# Patient Record
Sex: MALE | Race: OTHER | HISPANIC OR LATINO | URBAN - METROPOLITAN AREA
[De-identification: names, ages, dates, MRNs, and addresses within clinical notes are randomized per-mention and may not be internally consistent; named-entity substitution may affect disease eponyms.]

---

## 2024-01-17 ENCOUNTER — EMERGENCY (EMERGENCY)
Facility: HOSPITAL | Age: 44
LOS: 0 days | Discharge: HOME | End: 2024-01-17
Attending: EMERGENCY MEDICINE
Payer: MEDICAID

## 2024-01-17 VITALS
DIASTOLIC BLOOD PRESSURE: 82 MMHG | RESPIRATION RATE: 18 BRPM | TEMPERATURE: 99 F | SYSTOLIC BLOOD PRESSURE: 131 MMHG | OXYGEN SATURATION: 97 % | HEART RATE: 77 BPM | WEIGHT: 164.91 LBS | HEIGHT: 65 IN
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
## 2024-01-17 DIAGNOSIS — M54.9 DORSALGIA, UNSPECIFIED: ICD-10-CM

## 2024-01-17 DIAGNOSIS — S30.0XXA CONTUSION OF LOWER BACK AND PELVIS, INITIAL ENCOUNTER: ICD-10-CM

## 2024-01-17 PROCEDURE — 99284 EMERGENCY DEPT VISIT MOD MDM: CPT

## 2024-01-17 PROCEDURE — 99283 EMERGENCY DEPT VISIT LOW MDM: CPT | Mod: 25

## 2024-01-17 PROCEDURE — 96372 THER/PROPH/DIAG INJ SC/IM: CPT

## 2024-01-17 RX ORDER — METHOCARBAMOL 500 MG/1
1 TABLET, FILM COATED ORAL
Qty: 30 | Refills: 0
Start: 2024-01-17

## 2024-01-17 RX ORDER — IBUPROFEN 200 MG
1 TABLET ORAL
Qty: 30 | Refills: 0
Start: 2024-01-17

## 2024-01-17 RX ORDER — METHOCARBAMOL 500 MG/1
1500 TABLET, FILM COATED ORAL ONCE
Refills: 0 | Status: COMPLETED | OUTPATIENT
Start: 2024-01-17 | End: 2024-01-17

## 2024-01-17 RX ORDER — KETOROLAC TROMETHAMINE 30 MG/ML
30 SYRINGE (ML) INJECTION ONCE
Refills: 0 | Status: DISCONTINUED | OUTPATIENT
Start: 2024-01-17 | End: 2024-01-17

## 2024-01-17 RX ORDER — CYCLOBENZAPRINE HYDROCHLORIDE 10 MG/1
1 TABLET, FILM COATED ORAL
Qty: 30 | Refills: 0
Start: 2024-01-17

## 2024-01-17 RX ADMIN — Medication 30 MILLIGRAM(S): at 01:36

## 2024-01-17 RX ADMIN — METHOCARBAMOL 1500 MILLIGRAM(S): 500 TABLET, FILM COATED ORAL at 01:36

## 2024-01-17 NOTE — ED PROVIDER NOTE - ATTENDING APP SHARED VISIT CONTRIBUTION OF CARE
s/p fall, hit low back, no loc. no midline pain. hurts to turn torso.  no ab pain. no other injury. no neuro complaints.    abrasion to left lower lumbar rgion. spine nt. FROM. no focal def. ab soft nt. no cvat.

## 2024-01-17 NOTE — ED PROVIDER NOTE - OBJECTIVE STATEMENT
42 yo male no sig hx present c/o left sided back pain s/p slipped and fell on ice and slid down 8 concrete step outside his home. pain to left sided back exacerbated with movement but improve while remaining still. denies numbness and weakness to b/l LE and ambulatory difficulty. denies head injury and LOC. deneis neck pain/ chest pain/ abd pain/ sob/ n/v/d.

## 2024-01-17 NOTE — ED PROVIDER NOTE - PHYSICAL EXAMINATION
CONSTITUTIONAL: Well-appearing; in no apparent distress.   EYES: PERRL; EOM intact.   CARDIOVASCULAR: Normal S1, S2; no murmurs, rubs, or gallops.   RESPIRATORY: Normal chest excursion with respiration; breath sounds clear and equal bilaterally; no wheezes, rhonchi, or rales.  GI/: Normal bowel sounds; non-distended; non-tender; no palpable organomegaly.   MS: mild swelling with ttp to left flank muscles. no midline tenderness to neck/back, painful ROM to lateral rotation of torso and flexion toward the left side of back. neck with FROM without pain. sensation and strength equal b/l UE/LE, ambulate with nml and steady gait.   SKIN: No skin injury to left sided back.   NEURO/PSYCH: A & O x 4; grossly unremarkable.

## 2024-01-17 NOTE — ED PROVIDER NOTE - NSFOLLOWUPCLINICS_GEN_ALL_ED_FT
[facility]  Medicine  [address], [place]   Phone: [phone]  Fax:

## 2024-01-17 NOTE — ED PROVIDER NOTE - PATIENT PORTAL LINK FT
You can access the FollowMyHealth Patient Portal offered by [facility] by registering at the following website: [email or web address]. By joining [employer]’s FollowMyHealth portal, you will also be able to view your health information using other applications (apps) compatible with our system.

## 2024-01-17 NOTE — ED PROVIDER NOTE - CARE PLAN
Assessment and plan of treatment:	back injury  supportive care  outpt follow up   1 Principal Discharge DX:	Contusion of lower back  Assessment and plan of treatment:	back injury  supportive care  outpt follow up  Secondary Diagnosis:	Low back strain

## 2024-01-17 NOTE — ED PROVIDER NOTE - NS ED MD DISPO DISCHARGE CCDA
Population Health Social Work Follow Up Outreach    Social Work note:  Contacted pt for follow up.  Pt is doing well, just had home care visit for wound care.  Pt stated he has an eye operation at [facility] on Friday and has a friend that will drive him.    Pt met with [employer], Irene, last week, \"she took pictures of a bunch of my documents but I didn't have the proof that I own the house and car.  I found that paperwork now so I'll call her today and see if she can come take pictures of those documents.\"      Patient Next Step:  Complete Medicaid application for long term Medicaid     Social Work next step:  Await LTC Medicaid determination      Next Contact: Week of 11/2 or upon call from patient    
Patient/Caregiver provided printed discharge information.

## 2024-01-17 NOTE — ED PROVIDER NOTE - NSFOLLOWUPINSTRUCTIONS_ED_ALL_ED_FT
Contusion    Continues R.I.C.E (Rest, Ice, Compression and Elevation) in the next few days. Apply ice 3-4 times a day and 15 minutes a time.    A contusion is a deep bruise. Contusions are the result of a blunt injury to tissues and muscle fibers under the skin. The skin overlying the contusion may turn blue, purple, or yellow. Symptoms also include pain and swelling in the injured area.    SEEK IMMEDIATE MEDICAL CARE IF YOU HAVE THE FOLLOWING SYMPTOMS: severe pain, numbness, tingling, pain, weakness, or skin color/temperature change in any part of your body distal to the fracture.     Low Back Strain    WHAT YOU NEED TO KNOW:    Low back strain is an injury to your lower back muscles or tendons. Tendons are strong tissues that connect muscles to bones. The lower back supports most of your body weight and helps you move, twist, and bend.     DISCHARGE INSTRUCTIONS:    Return to the emergency department if:  You hear or feel a pop in your lower back.  You have increased swelling or pain in your lower back.  You have trouble moving your legs.  Your legs are numb.    Contact your healthcare provider if:   You have a fever.  Your pain does not go away, even after treatment.   You have questions or concerns about your condition or care.     Medicines: The following medicines may be ordered by your healthcare provider:     Acetaminophen decreases pain and fever. It is available without a doctor's order. Ask how much to take and how often to take it. Follow directions. Acetaminophen can cause liver damage if not taken correctly.    NSAIDs, such as ibuprofen, help decrease swelling, pain, and fever. This medicine is available with or without a doctor's order. NSAIDs can cause stomach bleeding or kidney problems in certain people. If you take blood thinner medicine, always ask your healthcare provider if NSAIDs are safe for you. Always read the medicine label and follow directions.    Muscle relaxers help decrease pain and muscle spasms.    Prescription pain medicine may be given. Ask how to take this medicine safely.    Take your medicine as directed. Contact your healthcare provider if you think your medicine is not helping or if you have side effects. Tell him or her if you are allergic to any medicine. Keep a list of the medicines, vitamins, and herbs you take. Include the amounts, and when and why you take them. Bring the list or the pill bottles to follow-up visits. Carry your medicine list with you in case of an emergency.    Self-care:   Rest as directed. You may need to rest in bed for a period of time after your injury. Do not lift heavy objects.   Apply ice on your back for 15 to 20 minutes every hour or as directed. Use an ice pack, or put crushed ice in a plastic bag. Cover it with a towel. Ice helps prevent tissue damage and decreases swelling and pain.  Apply heat on your lower back for 20 to 30 minutes every 2 hours for as many days as directed. Heat helps decrease pain and muscle spasms.  Slowly start to increase your activity as the pain decreases, or as directed.    Prevent another low back strain:   Use correct body movements.   Bend at the hips and knees when you  objects. Do not bend from the waist. Use your leg muscles as you lift the load. Do not use your back. Keep the object close to your chest as you lift it. Try not to twist or lift anything above your waist.    Change your position often when you stand for long periods of time. Rest one foot on a small box or footrest, and then switch to the other foot often.    Try not to sit for long periods of time. When you do, sit in a straight-backed chair with your feet flat on the floor.    Never reach, pull, or push while you are sitting.    Warm up before you exercise. Do exercises that strengthen your back muscles. Ask your healthcare provider about the best exercise plan for you.    Maintain a healthy weight. Ask your healthcare provider how much you should weigh. Ask him to help you create a weight loss plan if you are overweight.

## 2024-01-23 ENCOUNTER — INPATIENT (INPATIENT)
Facility: HOSPITAL | Age: 44
LOS: 0 days | Discharge: LEFT BEFORE DISCHARGE | End: 2024-01-24
Attending: SURGERY | Admitting: SURGERY
Payer: MEDICAID

## 2024-01-23 VITALS
SYSTOLIC BLOOD PRESSURE: 135 MMHG | HEIGHT: 65 IN | HEART RATE: 86 BPM | DIASTOLIC BLOOD PRESSURE: 77 MMHG | WEIGHT: 164.91 LBS | OXYGEN SATURATION: 100 % | TEMPERATURE: 98 F | RESPIRATION RATE: 20 BRPM

## 2024-01-23 VITALS
RESPIRATION RATE: 20 BRPM | HEART RATE: 84 BPM | TEMPERATURE: 98 F | DIASTOLIC BLOOD PRESSURE: 87 MMHG | OXYGEN SATURATION: 100 % | SYSTOLIC BLOOD PRESSURE: 152 MMHG

## 2024-01-23 DIAGNOSIS — K81.9 CHOLECYSTITIS, UNSPECIFIED: ICD-10-CM

## 2024-01-23 PROCEDURE — 99284 EMERGENCY DEPT VISIT MOD MDM: CPT

## 2024-01-23 PROCEDURE — 72131 CT LUMBAR SPINE W/O DYE: CPT | Mod: 26,MA

## 2024-01-23 RX ORDER — LIDOCAINE 4 G/100G
1 CREAM TOPICAL
Qty: 1 | Refills: 0
Start: 2024-01-23 | End: 2024-01-27

## 2024-01-23 RX ORDER — ACETAMINOPHEN 500 MG
975 TABLET ORAL ONCE
Refills: 0 | Status: COMPLETED | OUTPATIENT
Start: 2024-01-23 | End: 2024-01-23

## 2024-01-23 RX ORDER — TIZANIDINE 4 MG/1
1 TABLET ORAL
Qty: 15 | Refills: 0
Start: 2024-01-23 | End: 2024-01-27

## 2024-01-23 RX ORDER — LIDOCAINE 4 G/100G
1 CREAM TOPICAL ONCE
Refills: 0 | Status: COMPLETED | OUTPATIENT
Start: 2024-01-23 | End: 2024-01-23

## 2024-01-23 RX ADMIN — Medication 975 MILLIGRAM(S): at 11:49

## 2024-01-23 RX ADMIN — LIDOCAINE 1 PATCH: 4 CREAM TOPICAL at 11:50

## 2024-01-23 NOTE — ED PROVIDER NOTE - ATTENDING CONTRIBUTION TO CARE
44 yo m no pmh  pt presents s/p fall. pt fell down ~8 steps several days ago. pt was seen in ED, treated w/ msk relaxer and nsaid. pt states pain is still moderate despite treatment and came for reassessment. no numbness/weakness/incontinence/headache/sob/cp/back pain.    vss  gen- NAD, aaox3  card-rrr  lungs-ctab, no wheezing or rhonchi  abd-sntnd, no guarding or rebound  neuro- full str/sensation, cn ii-xii grossly intact, normal coordination and gait  spine- L lower lumbar tenderness, no skin changes

## 2024-01-23 NOTE — ED PROVIDER NOTE - PHYSICAL EXAMINATION
Constitutional: Well developed, well nourished, no acute distress  Head: Normocephalic, Atraumatic  Eyes: PERRLA, EOMI, conjunctiva and sclera WNL  ENT: Moist mucous membranes, no rhinorrhea,   Neck: Supple, Nontender\, no midline tenderness   Respiratory: Normal chest excursion with respiration; Breath sounds clear and equal B/L; No wheezes, rales, or rhonchi   Cardiovascular: RRR; Normal S1, S2; No murmurs, rubs or gallops   ABD/GI:   Nondistended; Nontender; No guarding, rigidity or rebound   EXT/MS: Moving all extremities; Distal pulses 2+ B/L; No peripheral edema, No midline spinal tenderness, left lumbar paraspinal muscular tenderness, pt able to ambulate   Skin: Normal for age and race; Warm and dry; No rash  Neurologic: AAO x 4; Normal motor and sensory function  Psychiatric: Appropriate affect, normal mood

## 2024-01-23 NOTE — ED PROVIDER NOTE - CARE PLAN
1 Principal Discharge DX:	Cholelithiasis   Principal Discharge DX:	Lumbar transverse process fracture

## 2024-01-23 NOTE — ED PROVIDER NOTE - NSFOLLOWUPINSTRUCTIONS_ED_ALL_ED_FT
Back Pain    Back pain is very common in adults. The cause of back pain is rarely dangerous and the pain often gets better over time. The cause of your back pain may not be known and may include strain of muscles or ligaments, degeneration of the spinal disks, or arthritis. Occasionally the pain may radiate down your leg(s). Over-the-counter medicines to reduce pain and inflammation are often the most helpful. Stretching and remaining active frequently helps the healing process.     SEEK IMMEDIATE MEDICAL CARE IF YOU HAVE THE FOLLOWING SYMPTOMS: bowel or bladder control problems, unusual weakness or numbness in your arms or legs, nausea or vomiting, abdominal pain, fever, dizziness/lightheadedness. Our Emergency Department Referral Coordinators will be reaching out to you in the next 24-48 hours from 9:00am to 5:00pm with a follow up appointment. Please expect a phone call from the hospital in that time frame. If you do not receive a call or if you have any questions or concerns, you can reach them at [phone].    Follow up with neurosurgery, physical therapy and pain management within 1 week for reassessment       Back Pain    Back pain is very common in adults. The cause of back pain is rarely dangerous and the pain often gets better over time. The cause of your back pain may not be known and may include strain of muscles or ligaments, degeneration of the spinal disks, or arthritis. Occasionally the pain may radiate down your leg(s). Over-the-counter medicines to reduce pain and inflammation are often the most helpful. Stretching and remaining active frequently helps the healing process.     SEEK IMMEDIATE MEDICAL CARE IF YOU HAVE THE FOLLOWING SYMPTOMS: bowel or bladder control problems, unusual weakness or numbness in your arms or legs, nausea or vomiting, abdominal pain, fever, dizziness/lightheadedness.

## 2024-01-23 NOTE — CONSULT NOTE ADULT - ASSESSMENT
43M with no significant PMH s/p slip and fall x 1 week ago with mildly displaced left L1/L2 transverse process fx     PLAN   - No acute neurosurgical intervention   - Pain control / muscle relaxants  - Can see pain management outpatient as needed    - Discussed case with attending

## 2024-01-23 NOTE — ED PROVIDER NOTE - OBJECTIVE STATEMENT
43-year-old male with no significant past medical history presenting with left lower back pain for 1 week.  Patient reports he slipped and fell backwards 1 week ago.  Patient presented to ED at that time and was prescribed Robaxin and ibuprofen when discharged.  Patient last took Robaxin ibuprofen at 8 AM today.  Patient reports medication have not been significantly improving his back pain.  Patient denies any numbness or tingling in extremities, pain rating down his legs, saddle anesthesia, loss of bladder/bowel control.  Denies IV drug use.  Patient has no other complaints at this time.

## 2024-01-23 NOTE — ED ADULT NURSE NOTE - NSFALLUNIVINTERV_ED_ALL_ED
Bed/Stretcher in lowest position, wheels locked, appropriate side rails in place/Call bell, personal items and telephone in reach/Instruct patient to call for assistance before getting out of bed/chair/stretcher/Non-slip footwear applied when patient is off stretcher/[place] to call system/Physically safe environment - no spills, clutter or unnecessary equipment/Purposeful proactive rounding/Room/bathroom lighting operational, light cord in reach

## 2024-01-23 NOTE — ED PROVIDER NOTE - NSFOLLOWUPCLINICS_GEN_ALL_ED_FT
[facility]  Physical Therapy  Multiple Location  [place]   Phone: [phone]  Fax:

## 2024-01-23 NOTE — ED PROVIDER NOTE - CLINICAL SUMMARY MEDICAL DECISION MAKING FREE TEXT BOX
ed w/u w/ 2 lumbar TP fx  pain improved w/ ed care  case d/w neurosurg who cleared for dc w/ f/u  Referral coordinator utilized to assist in making and in expediting outpatient follow up.

## 2024-01-23 NOTE — CONSULT NOTE ADULT - SUBJECTIVE AND OBJECTIVE BOX
NEUROSURGERY CONSULT  MARION MCLAUGHLIN   01-23-24 @ 15:33    HPI: 43-year-old male with no significant past medical history presenting with left lower back pain for 1 week.  Patient reports he slipped and fell backwards 1 week ago.  Patient presented to ED at that time and was prescribed Robaxin and ibuprofen when discharged.  Patient last took Robaxin ibuprofen at 8 AM today.  Patient reports medication have not been significantly improving his back pain.  Patient denies any numbness or tingling in extremities, pain rating down his legs, saddle anesthesia, loss of bladder/bowel control.  Denies IV drug use.  Patient has no other complaints at this time.    Neurosurgery was consulted for patient presenting with back pain x 1 week s/p slip and fall on ice. States that he has left sided lower back pain, states with some pain down L flank. Denies any numbness, tingling, radicular pain, saddle anesthesia, urinary or bowel incontinence. CT showing mildly displaced L L1/L2 TP Fx.     RADIOLOGY:   < from: CT Lumbar Spine No Cont (01.23.24 @ 12:50) >  IMPRESSION:  Mildly displaced acutefractures in the left transverse processes of L1   and L2.      PAST MEDICAL & SURGICAL HISTORY:  No pertinent past medical history    FAMILY HISTORY:    SOCIAL HISTORY:  Tobacco Use:  EtOH use:   Substance:    Allergies    No Known Allergies    Intolerances        [X] A ten-point review of systems is negative except as noted   [  ] Due to altered mental status/intubation, subjective information were not able to be obtained from the patient. History was obtained, to the extent possible, from review of the chart and collateral sources of information    MEDS:      PHYSICAL EXAM:  Awake, alert, following commands   PERRL   MAEx4 with good strength   SILT   No clonus   TTP of left lower lumbar spine     Vital Signs Last 24 Hrs  T(C): 36.5 (23 Jan 2024 15:18), Max: 36.7 (23 Jan 2024 10:17)  T(F): 97.7 (23 Jan 2024 15:18), Max: 98 (23 Jan 2024 10:17)  HR: 84 (23 Jan 2024 15:18) (84 - 86)  BP: 152/87 (23 Jan 2024 15:18) (135/77 - 152/87)  BP(mean): --  RR: 20 (23 Jan 2024 15:18) (20 - 20)  SpO2: 100% (23 Jan 2024 15:18) (100% - 100%)    Parameters below as of 23 Jan 2024 15:18  Patient On (Oxygen Delivery Method): room air          LABS:
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
## 2024-02-01 DIAGNOSIS — S32.029A UNSPECIFIED FRACTURE OF SECOND LUMBAR VERTEBRA, INITIAL ENCOUNTER FOR CLOSED FRACTURE: ICD-10-CM

## 2024-02-01 DIAGNOSIS — S32.019A UNSPECIFIED FRACTURE OF FIRST LUMBAR VERTEBRA, INITIAL ENCOUNTER FOR CLOSED FRACTURE: ICD-10-CM

## 2024-02-01 DIAGNOSIS — Z53.29 PROCEDURE AND TREATMENT NOT CARRIED OUT BECAUSE OF PATIENT'S DECISION FOR OTHER REASONS: ICD-10-CM
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
## 2024-03-22 ENCOUNTER — APPOINTMENT (OUTPATIENT)
Dept: INTERNAL MEDICINE | Facility: CLINIC | Age: 44
End: 2024-03-22
Payer: SELF-PAY

## 2024-03-22 ENCOUNTER — OUTPATIENT (OUTPATIENT)
Dept: OUTPATIENT SERVICES | Facility: HOSPITAL | Age: 44
LOS: 1 days | End: 2024-03-22
Payer: SELF-PAY

## 2024-03-22 VITALS
OXYGEN SATURATION: 99 % | WEIGHT: 170 LBS | TEMPERATURE: 97.7 F | BODY MASS INDEX: 26.68 KG/M2 | SYSTOLIC BLOOD PRESSURE: 137 MMHG | DIASTOLIC BLOOD PRESSURE: 81 MMHG | HEIGHT: 67 IN | HEART RATE: 74 BPM

## 2024-03-22 DIAGNOSIS — S32.029A UNSPECIFIED FRACTURE OF SECOND LUMBAR VERTEBRA, INITIAL ENCOUNTER FOR CLOSED FRACTURE: ICD-10-CM

## 2024-03-22 DIAGNOSIS — Z00.00 ENCOUNTER FOR GENERAL ADULT MEDICAL EXAMINATION W/OUT ABNORMAL FINDINGS: ICD-10-CM

## 2024-03-22 DIAGNOSIS — K42.9 UMBILICAL HERNIA W/OUT OBSTRUCTION OR GANGRENE: ICD-10-CM

## 2024-03-22 DIAGNOSIS — Z00.00 ENCOUNTER FOR GENERAL ADULT MEDICAL EXAMINATION WITHOUT ABNORMAL FINDINGS: ICD-10-CM

## 2024-03-22 DIAGNOSIS — H11.001 UNSPECIFIED PTERYGIUM OF RIGHT EYE: ICD-10-CM
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
## 2024-03-22 DIAGNOSIS — S32.019A UNSPECIFIED FRACTURE OF FIRST LUMBAR VERTEBRA, INITIAL ENCOUNTER FOR CLOSED FRACTURE: ICD-10-CM

## 2024-03-22 PROCEDURE — 99386 PREV VISIT NEW AGE 40-64: CPT

## 2024-03-22 NOTE — ASSESSMENT
[FreeTextEntry1] : 43-year-old male with no significant past medical history presents to clinic as a new patient.   #Mildly displaced acute fractures in left transverse processes of L1 and L2 - Patient presented to [facility] ED in January 2024 after he slipped and fell backwards and landed on his back on ice - CT lumbar spine scan showed mildly displaced acute fractures in the left transverse processes of L1 and L2 - Seen by neurosurgery in the hospital - no acute intervention - Patient states that his pain is much better than it was in January - He endorses some weakness walking up steps - neurosurgery referral   #Pterygium of right eye - Ophthalmology referral  #Umbilical hernia - does not bother patient, not in any pain - counseled to monitor for pain or growth  #HCM - Routine labs sent - RTC in 6 months

## 2024-03-22 NOTE — HISTORY OF PRESENT ILLNESS
[FreeTextEntry1] : New visit [de-identified] : 43-year-old male with no significant past medical history presents to clinic as a new patient. Patient presented to [facility] ED in January 2024 after he slipped and fell backwards and landed on his back on ice. CT lumbar spine scan showed mildly displaced acute fractures in the left transverse processes of L1 and L2.

## 2024-03-22 NOTE — REVIEW OF SYSTEMS
[Fever] : no fever [Shortness Of Breath] : no shortness of breath [Chest Pain] : no chest pain [Wheezing] : no wheezing [Cough] : no cough [Abdominal Pain] : no abdominal pain [Nausea] : no nausea [Vomiting] : no vomiting [FreeTextEntry9] : Mild back pain [Headache] : no headache

## 2024-03-22 NOTE — PHYSICAL EXAM
[No Acute Distress] : no acute distress [No Respiratory Distress] : no respiratory distress  [No Accessory Muscle Use] : no accessory muscle use [Normal Rate] : normal rate  [Regular Rhythm] : with a regular rhythm [Soft] : abdomen soft [Non Tender] : non-tender [Non-distended] : non-distended [de-identified] : Umbilical hernia [de-identified] : Pterygium of right eye

## 2024-03-26 DIAGNOSIS — S32.019A UNSPECIFIED FRACTURE OF FIRST LUMBAR VERTEBRA, INITIAL ENCOUNTER FOR CLOSED FRACTURE: ICD-10-CM

## 2024-03-26 DIAGNOSIS — Z00.00 ENCOUNTER FOR GENERAL ADULT MEDICAL EXAMINATION WITHOUT ABNORMAL FINDINGS: ICD-10-CM

## 2024-03-26 DIAGNOSIS — K42.9 UMBILICAL HERNIA WITHOUT OBSTRUCTION OR GANGRENE: ICD-10-CM

## 2024-03-26 DIAGNOSIS — S32.029A UNSPECIFIED FRACTURE OF SECOND LUMBAR VERTEBRA, INITIAL ENCOUNTER FOR CLOSED FRACTURE: ICD-10-CM

## 2024-03-26 DIAGNOSIS — H11.001 UNSPECIFIED PTERYGIUM OF RIGHT EYE: ICD-10-CM

## 2025-01-24 ENCOUNTER — APPOINTMENT (OUTPATIENT)
Dept: INTERNAL MEDICINE | Facility: CLINIC | Age: 45
End: 2025-01-24